# Patient Record
Sex: MALE | Race: WHITE | NOT HISPANIC OR LATINO | Employment: OTHER | URBAN - METROPOLITAN AREA
[De-identification: names, ages, dates, MRNs, and addresses within clinical notes are randomized per-mention and may not be internally consistent; named-entity substitution may affect disease eponyms.]

---

## 2022-05-18 ENCOUNTER — TRANSCRIBE ORDERS (OUTPATIENT)
Dept: PHYSICAL THERAPY | Facility: CLINIC | Age: 72
End: 2022-05-18

## 2022-05-18 DIAGNOSIS — M54.50 LOW BACK PAIN, UNSPECIFIED BACK PAIN LATERALITY, UNSPECIFIED CHRONICITY, UNSPECIFIED WHETHER SCIATICA PRESENT: Primary | ICD-10-CM

## 2022-06-02 ENCOUNTER — TREATMENT (OUTPATIENT)
Dept: PHYSICAL THERAPY | Facility: CLINIC | Age: 72
End: 2022-06-02

## 2022-06-02 DIAGNOSIS — G89.29 CHRONIC LEFT-SIDED LOW BACK PAIN WITHOUT SCIATICA: Primary | ICD-10-CM

## 2022-06-02 DIAGNOSIS — M54.50 CHRONIC LEFT-SIDED LOW BACK PAIN WITHOUT SCIATICA: Primary | ICD-10-CM

## 2022-06-02 PROCEDURE — 97162 PT EVAL MOD COMPLEX 30 MIN: CPT | Performed by: PHYSICAL THERAPIST

## 2022-06-02 PROCEDURE — 97110 THERAPEUTIC EXERCISES: CPT | Performed by: PHYSICAL THERAPIST

## 2022-06-02 NOTE — PROGRESS NOTES
Physical Therapy Initial Evaluation and Plan of Care      Subjective Evaluation    History of Present Illness  Mechanism of injury: Pt is a 72 year old male presenting to PT with chronic low back pain. The pain is mostly on his left side. He denies any radicular symptoms.  He has had low back pain since college. 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 He also has rheumatoid arthritis which has worsened his pain. He is hoping to see a neurologist, but he has to try PT first. Any time he is sitting and he goes to stand up, he feels such intense pain that it makes him cry. Bending over and twisting are also really painful. Getting in and out of bed is also painful. He is taking pregablin and another medication he cannot remember the name of. He wants to start walking in the pool for exercises. He has not had any surgeries on his lower back. He reports he does not walk with any AD. He has had 4 falls in the last year due to his back pain.      Patient Occupation: retired Quality of life: excellent    Pain  Current pain rating: 3  At worst pain ratin  Quality: dull ache and knife-like  Relieving factors: heat and ice  Aggravating factors: ambulation, squatting, prolonged positioning, repetitive movement and lifting  Progression: worsening    Social Support  Lives in: apartment    Patient Goals  Patient goals for therapy: decreased pain, increased motion, increased strength, independence with ADLs/IADLs and return to sport/leisure activities             Objective          Tenderness     Lumbar Spine  Tenderness in the spinous process.     Left Hip   Tenderness in the PSIS.     Additional Tenderness Details  Pt is tender along the spinous processes of the lumbar spine    Neurological Testing     Sensation     Lumbar   Left   Diminished: light touch    Right   Intact: light touch    Comments   Left light touch: L2, L4    Reflexes   Left   Patellar (L4): normal  (2+)    Right   Patellar (L4): normal (2+)    Active Range of Motion     Lumbar   Flexion: 51 degrees   Extension: 0 degrees   Left lateral flexion: 2 degrees   Right lateral flexion: 5 degrees     Strength/Myotome Testing     Left Hip   Planes of Motion   Flexion: 4  Abduction: 3+    Right Hip   Planes of Motion   Flexion: 4+  Abduction: 3+    Left Knee   Flexion: 4  Extension: 4    Right Knee   Flexion: 5  Extension: 5    Left Ankle/Foot   Dorsiflexion: 4+  Plantar flexion: 5    Right Ankle/Foot   Dorsiflexion: 5  Plantar flexion: 5    Tests       Thoracic   Negative slump.     Ambulation     Quality of Movement During Gait   Trunk  Forward lean.     Pelvis    Pelvis (Left): Positive Trendelenburg.   Pelvis (Right): Positive Trendelenburg.     Knee    Knee (Left): Positive quad avoidance.           Assessment & Plan     Assessment  Impairments: abnormal coordination, abnormal gait, abnormal muscle tone, abnormal or restricted ROM, activity intolerance, impaired physical strength, lacks appropriate home exercise program and pain with function  Functional Limitations: lifting, sleeping, walking, uncomfortable because of pain, moving in bed, standing, stooping and unable to perform repetitive tasks  Assessment details: Pt is a 72 year old male presenting to the clinic with chronic low back pain. He has decreased lumbar AROM, decreased strength in the left LE, abnormal gait pattern, and decreased sensation in the left leg. His impairments are limiting his ability to walk and ascend steps without pain in the low back. Pt would benefit from skilled PT to address his impairments so he can reach his long term goals.  Prognosis: good    Goals  Plan Goals: SHORT TERM GOALS:     2 weeks  1. Pt independent with HEP  2. Pt to demonstrate trunk AROM 50-75% of expected norms to allow for improved ability to perform ADL's  3. Pt to demonstrate bilateral hip strength 4/5 in all planes to improved stability of the core/trunk      LONG TERM GOALS:   6 weeks  1. Pt to demonstrate trunk AROM % of expected norms to allow for improved ability to perform functional activities  2. Pt to demonstrate ability to perform full functional squat with good form and without increased pain in the low back   3. Pt to report being able to work full shift or work in the home without increase in pain in the back      Plan  Therapy options: will be seen for skilled therapy services  Planned modality interventions: cryotherapy, dry needling, electrical stimulation/Russian stimulation, high voltage pulsed current (pain management), TENS and thermotherapy (hydrocollator packs)  Planned therapy interventions: abdominal trunk stabilization, balance/weight-bearing training, body mechanics training, functional ROM exercises, gait training, home exercise program, manual therapy, neuromuscular re-education, postural training, soft tissue mobilization, spinal/joint mobilization, strengthening, stretching and therapeutic activities  Duration in visits: 2  Duration in weeks: 12  Treatment plan discussed with: patient        Manual Therapy:         mins  20902;  Therapeutic Exercise:    15     mins  46264;     Neuromuscular Dulce:        mins  [id number];    Therapeutic Activity:          mins  32047;     Gait Training:           mins  87278;     Ultrasound:          mins  27833;    Electrical Stimulation:         mins  79267 ( );  Dry Needling          mins self-pay    Timed Treatment:   15   mins   Total Treatment:     50   mins    PT SIGNATURE: Lorena Villavicencio, BRENDAN   DATE TREATMENT INITIATED: 6/2/2022    Initial Certification  Certification Period: 6/2/2022 thru 8/30/2022  I certify that the therapy services are furnished while this patient is under my care.  The services outlined above are required by this patient, and will be reviewed every 90 days.     PHYSICIAN: Nikki Jacobo MD      DATE:     Please sign and return via fax to  [phone].. Thank you, [facility].